# Patient Record
Sex: FEMALE | Race: OTHER | URBAN - METROPOLITAN AREA
[De-identification: names, ages, dates, MRNs, and addresses within clinical notes are randomized per-mention and may not be internally consistent; named-entity substitution may affect disease eponyms.]

---

## 2017-02-17 ENCOUNTER — IMPORTED ENCOUNTER (OUTPATIENT)
Dept: URBAN - METROPOLITAN AREA CLINIC 50 | Facility: CLINIC | Age: 62
End: 2017-02-17

## 2017-09-08 ENCOUNTER — IMPORTED ENCOUNTER (OUTPATIENT)
Dept: URBAN - METROPOLITAN AREA CLINIC 50 | Facility: CLINIC | Age: 62
End: 2017-09-08

## 2021-04-17 ASSESSMENT — VISUAL ACUITY
OS_CC: 20/20
OS_CC: J1+
OD_CC: J1+
OD_CC: 20/20

## 2021-04-17 ASSESSMENT — TONOMETRY
OD_IOP_MMHG: 12
OS_IOP_MMHG: 11

## 2025-03-11 NOTE — PATIENT DISCUSSION
"""Informed patient that their cataract(s) are not visually significant or do not meet the criteria for cataract surgery.  Recommended attention to common cataract symptoms Detail Level: Generalized General Sunscreen Counseling: I recommended a broad spectrum sunscreen with a SPF of 30 or higher. I explained that sunscreens should be applied at least 15 minutes prior to expected sun exposure and replied every 2 hours or sooner if patient becomes wet or sweaty. I also discussed that sun-protective clothing can be used in lieu of sunscreen, particularly clothing with UPF.